# Patient Record
Sex: FEMALE | Race: WHITE | NOT HISPANIC OR LATINO | URBAN - METROPOLITAN AREA
[De-identification: names, ages, dates, MRNs, and addresses within clinical notes are randomized per-mention and may not be internally consistent; named-entity substitution may affect disease eponyms.]

---

## 2023-03-09 ENCOUNTER — TELEPHONE (OUTPATIENT)
Dept: PRIMARY CARE | Facility: CLINIC | Age: 28
End: 2023-03-09

## 2023-04-07 ENCOUNTER — TELEPHONE (OUTPATIENT)
Dept: PRIMARY CARE | Facility: CLINIC | Age: 28
End: 2023-04-07

## 2023-04-07 NOTE — TELEPHONE ENCOUNTER
Dr. Sánchez patient  Refill for Carbamazepine, Cyclobenzaprine, Doxycycline  Pharmacy: [facility] in [facility] [place]

## 2023-04-10 RX ORDER — CYCLOBENZAPRINE HCL 5 MG
5 TABLET ORAL 3 TIMES DAILY
COMMUNITY
End: 2023-04-25 | Stop reason: SDUPTHER

## 2023-04-10 RX ORDER — DEXMETHYLPHENIDATE HYDROCHLORIDE 20 MG/1
20 CAPSULE, EXTENDED RELEASE ORAL
COMMUNITY
Start: 2023-03-13 | End: 2023-05-02 | Stop reason: SDUPTHER

## 2023-04-25 ENCOUNTER — TELEMEDICINE (OUTPATIENT)
Dept: PRIMARY CARE | Facility: CLINIC | Age: 28
End: 2023-04-25
Payer: COMMERCIAL

## 2023-04-25 DIAGNOSIS — F41.9 ANXIETY: Primary | ICD-10-CM

## 2023-04-25 DIAGNOSIS — M54.50 CHRONIC RIGHT-SIDED LOW BACK PAIN WITHOUT SCIATICA: ICD-10-CM

## 2023-04-25 DIAGNOSIS — K58.9 IRRITABLE BOWEL SYNDROME, UNSPECIFIED TYPE: ICD-10-CM

## 2023-04-25 DIAGNOSIS — G89.29 CHRONIC RIGHT-SIDED LOW BACK PAIN WITHOUT SCIATICA: ICD-10-CM

## 2023-04-25 DIAGNOSIS — F32.9 MAJOR DEPRESSIVE DISORDER, REMISSION STATUS UNSPECIFIED, UNSPECIFIED WHETHER RECURRENT: ICD-10-CM

## 2023-04-25 DIAGNOSIS — F98.8 ATTENTION DEFICIT DISORDER (ADD) WITHOUT HYPERACTIVITY: ICD-10-CM

## 2023-04-25 PROBLEM — M48.061 SPINAL STENOSIS OF LUMBAR REGION: Status: ACTIVE | Noted: 2023-04-25

## 2023-04-25 PROBLEM — F41.0 PANIC ATTACKS: Status: ACTIVE | Noted: 2023-04-25

## 2023-04-25 PROBLEM — R10.9 ABDOMINAL PAIN: Status: ACTIVE | Noted: 2023-04-25

## 2023-04-25 PROBLEM — F32.A DEPRESSION: Status: ACTIVE | Noted: 2023-04-25

## 2023-04-25 PROCEDURE — 99213 OFFICE O/P EST LOW 20 MIN: CPT | Performed by: FAMILY MEDICINE

## 2023-04-25 RX ORDER — LAMOTRIGINE 200 MG/1
200 TABLET, EXTENDED RELEASE ORAL DAILY
COMMUNITY
End: 2023-05-08 | Stop reason: SDUPTHER

## 2023-04-25 RX ORDER — LAMOTRIGINE 200 MG/1
200 TABLET, EXTENDED RELEASE ORAL DAILY
Qty: 90 TABLET | Refills: 1 | Status: CANCELLED | OUTPATIENT
Start: 2023-04-25

## 2023-04-25 RX ORDER — ESZOPICLONE 2 MG/1
2 TABLET, FILM COATED ORAL NIGHTLY
COMMUNITY
End: 2024-01-12 | Stop reason: SDUPTHER

## 2023-04-25 RX ORDER — DICYCLOMINE HYDROCHLORIDE 20 MG/1
1 TABLET ORAL 3 TIMES DAILY
COMMUNITY
Start: 2020-06-10 | End: 2023-04-25 | Stop reason: SDUPTHER

## 2023-04-25 RX ORDER — SERTRALINE HYDROCHLORIDE 100 MG/1
100 TABLET, FILM COATED ORAL
COMMUNITY
End: 2023-05-08 | Stop reason: SDUPTHER

## 2023-04-25 RX ORDER — ESZOPICLONE 2 MG/1
2 TABLET, FILM COATED ORAL NIGHTLY
Qty: 30 TABLET | Refills: 2 | Status: CANCELLED | OUTPATIENT
Start: 2023-04-25

## 2023-04-25 RX ORDER — HYOSCYAMINE SULFATE 0.38 MG/1
0.38 TABLET, EXTENDED RELEASE ORAL 2 TIMES DAILY
Qty: 60 TABLET | Refills: 2 | Status: SHIPPED | OUTPATIENT
Start: 2023-04-25 | End: 2024-01-14 | Stop reason: WASHOUT

## 2023-04-25 RX ORDER — DEXMETHYLPHENIDATE HYDROCHLORIDE 20 MG/1
20 CAPSULE, EXTENDED RELEASE ORAL
Qty: 30 CAPSULE | Refills: 2 | Status: CANCELLED | OUTPATIENT
Start: 2023-04-25

## 2023-04-25 RX ORDER — LAMOTRIGINE 50 MG/1
50 TABLET, EXTENDED RELEASE ORAL DAILY
COMMUNITY
End: 2023-05-08 | Stop reason: SDUPTHER

## 2023-04-25 RX ORDER — CYCLOBENZAPRINE HCL 5 MG
5 TABLET ORAL 3 TIMES DAILY
Qty: 90 TABLET | Refills: 2 | Status: SHIPPED | OUTPATIENT
Start: 2023-04-25 | End: 2024-01-12 | Stop reason: SDUPTHER

## 2023-04-25 RX ORDER — LAMOTRIGINE 50 MG/1
50 TABLET, EXTENDED RELEASE ORAL DAILY
Qty: 90 TABLET | Refills: 1 | Status: CANCELLED | OUTPATIENT
Start: 2023-04-25

## 2023-04-25 RX ORDER — HYOSCYAMINE SULFATE 0.38 MG/1
1 TABLET, EXTENDED RELEASE ORAL 2 TIMES DAILY
COMMUNITY
Start: 2022-06-29 | End: 2023-04-25 | Stop reason: SDUPTHER

## 2023-04-25 RX ORDER — DICYCLOMINE HYDROCHLORIDE 20 MG/1
20 TABLET ORAL 3 TIMES DAILY
Qty: 90 TABLET | Refills: 2 | Status: SHIPPED | OUTPATIENT
Start: 2023-04-25 | End: 2024-01-14 | Stop reason: WASHOUT

## 2023-04-25 RX ORDER — SERTRALINE HYDROCHLORIDE 100 MG/1
100 TABLET, FILM COATED ORAL
Qty: 90 TABLET | Refills: 1 | Status: CANCELLED | OUTPATIENT
Start: 2023-04-25

## 2023-04-25 ASSESSMENT — ENCOUNTER SYMPTOMS
DECREASED CONCENTRATION: 0
DIARRHEA: 0
BLOOD IN STOOL: 0
MYALGIAS: 0
SINUS PAIN: 0
POLYPHAGIA: 0
NERVOUS/ANXIOUS: 0
TROUBLE SWALLOWING: 0
AGITATION: 0
CONFUSION: 0
STRIDOR: 0
COLOR CHANGE: 0
CONSTIPATION: 0
SHORTNESS OF BREATH: 0
DYSURIA: 0
RHINORRHEA: 0
CHEST TIGHTNESS: 0
SORE THROAT: 0
CONSTITUTIONAL NEGATIVE: 1
PHOTOPHOBIA: 0
FATIGUE: 0
SPEECH DIFFICULTY: 0
HEMATURIA: 0
SINUS PRESSURE: 0
ARTHRALGIAS: 0
APPETITE CHANGE: 0
COUGH: 0
SLEEP DISTURBANCE: 0
SEIZURES: 0
DIZZINESS: 0
ADENOPATHY: 0
POLYDIPSIA: 0
EYE PAIN: 0
DYSPHORIC MOOD: 0
ABDOMINAL DISTENTION: 0
HEADACHES: 0
NECK STIFFNESS: 0
FEVER: 0
RECTAL PAIN: 0
PALPITATIONS: 0
ACTIVITY CHANGE: 0
FLANK PAIN: 0
ABDOMINAL PAIN: 0

## 2023-04-25 NOTE — PROGRESS NOTES
Subjective   Patient ID: Kristina Ross is a 27 y.o. female who presents for Anxiety and Depression.    Patient presents today to discuss her anxiety and depression. Patient states her childhood psychiatrist has been managing her medications and she has been on a regular regimen management without having to change dose of medication, she is wondering if PCP will take over prescribing these medications. (Focalin, lunesta, Zoloft and Lamictal.)     Patient denies any other concerns today          Review of Systems   Constitutional: Negative.  Negative for activity change, appetite change, fatigue and fever.   HENT:  Negative for congestion, dental problem, ear discharge, ear pain, mouth sores, rhinorrhea, sinus pressure, sinus pain, sore throat, tinnitus and trouble swallowing.    Eyes:  Negative for photophobia, pain and visual disturbance.   Respiratory:  Negative for cough, chest tightness, shortness of breath and stridor.    Cardiovascular:  Negative for chest pain and palpitations.   Gastrointestinal:  Negative for abdominal distention, abdominal pain, blood in stool, constipation, diarrhea and rectal pain.   Endocrine: Negative for cold intolerance, heat intolerance, polydipsia, polyphagia and polyuria.   Genitourinary:  Negative for dysuria, flank pain, hematuria and urgency.   Musculoskeletal:  Negative for arthralgias, gait problem, myalgias and neck stiffness.   Skin:  Negative for color change and rash.   Allergic/Immunologic: Negative for environmental allergies and food allergies.   Neurological:  Negative for dizziness, seizures, syncope, speech difficulty and headaches.   Hematological:  Negative for adenopathy.   Psychiatric/Behavioral:  Negative for agitation, confusion, decreased concentration, dysphoric mood and sleep disturbance. The patient is not nervous/anxious.        Objective   /72   Wt 64.4 kg (142 lb)   BMI 24.37 kg/m²     Physical Exam  Constitutional: Well developed, well  nourished, alert and in no acute distress   Psychiatric: Mood calm and affect normal    Assessment/Plan   Problem List Items Addressed This Visit          Digestive    IBS (irritable bowel syndrome)    Relevant Medications    dicyclomine (Bentyl) 20 mg tablet    hyoscyamine ER (Levbid) 0.375 mg 12 hr tablet       Other    Depression    Chronic right-sided low back pain without sciatica    Relevant Medications    cyclobenzaprine (Flexeril) 5 mg tablet    Anxiety - Primary

## 2023-04-26 VITALS — SYSTOLIC BLOOD PRESSURE: 117 MMHG | WEIGHT: 142 LBS | DIASTOLIC BLOOD PRESSURE: 72 MMHG | BODY MASS INDEX: 24.37 KG/M2

## 2023-04-27 ENCOUNTER — CLINICAL SUPPORT (OUTPATIENT)
Dept: PRIMARY CARE | Facility: CLINIC | Age: 28
End: 2023-04-27
Payer: COMMERCIAL

## 2023-04-27 DIAGNOSIS — Z79.899 MEDICATION MANAGEMENT: ICD-10-CM

## 2023-04-27 PROCEDURE — 80368 SEDATIVE HYPNOTICS: CPT

## 2023-04-27 PROCEDURE — 80360 METHYLPHENIDATE: CPT

## 2023-04-27 PROCEDURE — 80354 DRUG SCREENING FENTANYL: CPT

## 2023-04-27 PROCEDURE — 80307 DRUG TEST PRSMV CHEM ANLYZR: CPT

## 2023-04-27 PROCEDURE — 80361 OPIATES 1 OR MORE: CPT

## 2023-04-27 PROCEDURE — 80346 BENZODIAZEPINES1-12: CPT

## 2023-04-27 PROCEDURE — 80365 DRUG SCREENING OXYCODONE: CPT

## 2023-04-27 PROCEDURE — 80373 DRUG SCREENING TRAMADOL: CPT

## 2023-04-27 PROCEDURE — 80358 DRUG SCREENING METHADONE: CPT

## 2023-04-27 NOTE — PROGRESS NOTES
Patient presents in office for a CSA & UDS. Patient performed procedure well without any complications.

## 2023-04-29 RX ORDER — DEXMETHYLPHENIDATE HYDROCHLORIDE 10 MG/1
10 TABLET ORAL DAILY
Qty: 30 TABLET | Refills: 0 | Status: SHIPPED | OUTPATIENT
Start: 2023-04-29 | End: 2023-06-20 | Stop reason: SDUPTHER

## 2023-04-29 NOTE — PATIENT INSTRUCTIONS
Follow up in    Continue current medications and therapy for chronic medical conditions.    Patient was advised importance of proper diet/nutrition in addition adequate hydration. Patient was encouraged moderate exercise program to include 30 minutes daily for 5 days of the week or 150 minutes weekly. Patient will follow-up with us as scheduled.    I personally reviewed the OARRS report for this patient. I have considered the risks of abuse, dependence, addiction, and diversion.    UDS/CSA:

## 2023-05-01 LAB
METHYLPHENIDATE URINE QUANTITATIVE: 4218.6 NG/ML
RITALINIC ACID URINE: 128.7 NG/ML

## 2023-05-02 DIAGNOSIS — F41.9 ANXIETY: ICD-10-CM

## 2023-05-02 DIAGNOSIS — F32.9 MAJOR DEPRESSIVE DISORDER, REMISSION STATUS UNSPECIFIED, UNSPECIFIED WHETHER RECURRENT: ICD-10-CM

## 2023-05-02 DIAGNOSIS — F41.0 PANIC ATTACKS: ICD-10-CM

## 2023-05-02 LAB
6-ACETYLMORPHINE: <25 NG/ML
7-AMINOCLONAZEPAM: <25 NG/ML
ALPHA-HYDROXYALPRAZOLAM: <25 NG/ML
ALPHA-HYDROXYMIDAZOLAM: <25 NG/ML
ALPRAZOLAM: <25 NG/ML
AMPHETAMINE (PRESENCE) IN URINE BY SCREEN METHOD: ABNORMAL
BARBITURATES PRESENCE IN URINE BY SCREEN METHOD: ABNORMAL
CANNABINOIDS IN URINE BY SCREEN METHOD: ABNORMAL
CHLORDIAZEPOXIDE: <25 NG/ML
CLONAZEPAM: <25 NG/ML
COCAINE (PRESENCE) IN URINE BY SCREEN METHOD: ABNORMAL
CODEINE: <50 NG/ML
CREATINE, URINE FOR DRUG: 18 MG/DL
DIAZEPAM: <25 NG/ML
DRUG SCREEN COMMENT URINE: ABNORMAL
EDDP: <25 NG/ML
FENTANYL CONFIRMATION, URINE: <2.5 NG/ML
HYDROCODONE: <25 NG/ML
HYDROMORPHONE: <25 NG/ML
LORAZEPAM: <25 NG/ML
METHADONE CONFIRMATION,URINE: <25 NG/ML
MIDAZOLAM: <25 NG/ML
MORPHINE URINE: <50 NG/ML
NORDIAZEPAM: <25 NG/ML
NORFENTANYL: <2.5 NG/ML
NORHYDROCODONE: <25 NG/ML
NOROXYCODONE: <25 NG/ML
O-DESMETHYLTRAMADOL: <50 NG/ML
OXAZEPAM: <25 NG/ML
OXYCODONE: <25 NG/ML
OXYMORPHONE: <25 NG/ML
PHENCYCLIDINE (PRESENCE) IN URINE BY SCREEN METHOD: ABNORMAL
SPECIFIC GRAVITY, URINE DRUG: 1
TEMAZEPAM: <25 NG/ML
TRAMADOL: <50 NG/ML
ZOLPIDEM METABOLITE (ZCA): <25 NG/ML
ZOLPIDEM: <25 NG/ML

## 2023-05-08 RX ORDER — LAMOTRIGINE 50 MG/1
50 TABLET, EXTENDED RELEASE ORAL DAILY
Qty: 90 TABLET | Refills: 1 | Status: SHIPPED | OUTPATIENT
Start: 2023-05-08 | End: 2024-01-12 | Stop reason: SDUPTHER

## 2023-05-08 RX ORDER — DEXMETHYLPHENIDATE HYDROCHLORIDE 20 MG/1
20 CAPSULE, EXTENDED RELEASE ORAL
Qty: 30 CAPSULE | Refills: 0 | Status: SHIPPED | OUTPATIENT
Start: 2023-05-08 | End: 2023-06-09 | Stop reason: SDUPTHER

## 2023-05-08 RX ORDER — LAMOTRIGINE 200 MG/1
200 TABLET, EXTENDED RELEASE ORAL DAILY
Qty: 90 TABLET | Refills: 1 | Status: SHIPPED | OUTPATIENT
Start: 2023-05-08 | End: 2024-01-12 | Stop reason: SDUPTHER

## 2023-05-08 RX ORDER — SERTRALINE HYDROCHLORIDE 100 MG/1
100 TABLET, FILM COATED ORAL
Qty: 90 TABLET | Refills: 1 | Status: SHIPPED | OUTPATIENT
Start: 2023-05-08 | End: 2024-01-12 | Stop reason: SDUPTHER

## 2023-05-08 NOTE — TELEPHONE ENCOUNTER
Dr Sánchez pt    Pt phoned office and is upset her meds have not been sent over and she had a VV last week specifically for med refills. Please send meds right away.    Focalin XR 20mg  Lamotrigine 50 mg and 200 mg  Zoloft 100mg    [address]  
Pt calling back to check on status of focalin   Please send to pharmacy asap  
Pt of dr tinoco  Pt requesting refill on focalin 20 mg  [facility]  
Will schedule an appointment with [facility] for patient.

## 2023-06-01 DIAGNOSIS — F98.8 ATTENTION DEFICIT DISORDER (ADD) WITHOUT HYPERACTIVITY: ICD-10-CM

## 2023-06-01 NOTE — TELEPHONE ENCOUNTER
Dr Sánchez pt    Pt phoned office and stated that her pharm is out of Focalin XR 20 mg.  They do have 10 mg XR and wants a script for that for double the amount of pills.

## 2023-06-04 RX ORDER — DEXMETHYLPHENIDATE HYDROCHLORIDE 10 MG/1
20 CAPSULE, EXTENDED RELEASE ORAL DAILY
Qty: 60 CAPSULE | Refills: 0 | Status: SHIPPED | OUTPATIENT
Start: 2023-06-04 | End: 2023-12-12 | Stop reason: SDUPTHER

## 2023-06-08 DIAGNOSIS — F98.8 ATTENTION DEFICIT DISORDER (ADD) WITHOUT HYPERACTIVITY: ICD-10-CM

## 2023-06-08 DIAGNOSIS — F41.9 ANXIETY: ICD-10-CM

## 2023-06-08 DIAGNOSIS — F32.9 MAJOR DEPRESSIVE DISORDER, REMISSION STATUS UNSPECIFIED, UNSPECIFIED WHETHER RECURRENT: ICD-10-CM

## 2023-06-08 DIAGNOSIS — F41.0 PANIC ATTACKS: ICD-10-CM

## 2023-06-14 RX ORDER — DEXMETHYLPHENIDATE HYDROCHLORIDE 20 MG/1
20 CAPSULE, EXTENDED RELEASE ORAL
Qty: 30 CAPSULE | Refills: 0 | Status: SHIPPED | OUTPATIENT
Start: 2023-06-14 | End: 2023-12-13 | Stop reason: DRUGHIGH

## 2023-06-14 RX ORDER — DEXMETHYLPHENIDATE HYDROCHLORIDE 20 MG/1
20 CAPSULE, EXTENDED RELEASE ORAL DAILY
Qty: 30 CAPSULE | Refills: 0 | Status: SHIPPED | OUTPATIENT
Start: 2023-07-12 | End: 2023-12-13 | Stop reason: DRUGHIGH

## 2023-06-14 RX ORDER — DEXMETHYLPHENIDATE HYDROCHLORIDE 20 MG/1
20 CAPSULE, EXTENDED RELEASE ORAL DAILY
Qty: 30 CAPSULE | Refills: 0 | Status: SHIPPED | OUTPATIENT
Start: 2023-08-12 | End: 2023-12-13 | Stop reason: DRUGHIGH

## 2023-06-20 RX ORDER — DEXMETHYLPHENIDATE HYDROCHLORIDE 10 MG/1
10 TABLET ORAL DAILY
Qty: 30 TABLET | Refills: 0 | Status: SHIPPED | OUTPATIENT
Start: 2023-06-20 | End: 2023-09-06 | Stop reason: SDUPTHER

## 2023-06-20 NOTE — TELEPHONE ENCOUNTER
Dr. Sánchez pt:  Refill on:dexmethylphenidate XR (Focalin XR) 10 mg tablets when out of meds since may 20   Pharmacy    [facility] #[id number] [address] [place] [address] [address]

## 2023-08-28 DIAGNOSIS — F98.8 ATTENTION DEFICIT DISORDER (ADD) WITHOUT HYPERACTIVITY: ICD-10-CM

## 2023-08-28 NOTE — TELEPHONE ENCOUNTER
PT RAEANN RAMSAY     PT CALLED IN FOR MED REFILL     FOCALIN 10 MG     [facility] ST[facility]#[id number]

## 2023-09-04 RX ORDER — DEXMETHYLPHENIDATE HYDROCHLORIDE 20 MG/1
20 CAPSULE, EXTENDED RELEASE ORAL DAILY
Qty: 30 CAPSULE | Refills: 0 | OUTPATIENT
Start: 2023-09-04

## 2023-12-01 DIAGNOSIS — F98.8 ATTENTION DEFICIT DISORDER (ADD) WITHOUT HYPERACTIVITY: ICD-10-CM

## 2023-12-01 NOTE — TELEPHONE ENCOUNTER
PT NEEDS REFILL ON FOCALIN XR 10MG  [facility] [place]

## 2023-12-09 RX ORDER — DEXMETHYLPHENIDATE HYDROCHLORIDE 20 MG/1
20 CAPSULE, EXTENDED RELEASE ORAL
Qty: 30 CAPSULE | Refills: 0 | OUTPATIENT
Start: 2023-12-09

## 2023-12-11 RX ORDER — DEXMETHYLPHENIDATE HYDROCHLORIDE 10 MG/1
10 TABLET ORAL DAILY
Qty: 20 TABLET | Refills: 0 | Status: CANCELLED | OUTPATIENT
Start: 2023-12-11

## 2023-12-12 RX ORDER — DEXMETHYLPHENIDATE HYDROCHLORIDE 10 MG/1
20 CAPSULE, EXTENDED RELEASE ORAL DAILY
Qty: 12 CAPSULE | Refills: 0 | Status: SHIPPED | OUTPATIENT
Start: 2023-12-12 | End: 2023-12-18 | Stop reason: SDUPTHER

## 2023-12-13 DIAGNOSIS — F98.8 ATTENTION DEFICIT DISORDER (ADD) WITHOUT HYPERACTIVITY: ICD-10-CM

## 2023-12-13 NOTE — TELEPHONE ENCOUNTER
Dr tinoco pt   Please resend   dexmethylphenidate XR (Focalin XR) 10 to say take one instead of two insurance will only cover one    Pharmacy    [facility] #[id number] [place], [place] - [address] [address] AT [address] [address]

## 2023-12-16 RX ORDER — DEXMETHYLPHENIDATE HYDROCHLORIDE 10 MG/1
10 CAPSULE, EXTENDED RELEASE ORAL DAILY
Qty: 6 CAPSULE | Refills: 0 | OUTPATIENT
Start: 2023-12-16 | End: 2023-12-22

## 2023-12-19 RX ORDER — DEXMETHYLPHENIDATE HYDROCHLORIDE 20 MG/1
20 CAPSULE, EXTENDED RELEASE ORAL DAILY
Qty: 6 CAPSULE | Refills: 0 | Status: SHIPPED | OUTPATIENT
Start: 2023-12-19 | End: 2023-12-25

## 2024-01-12 ENCOUNTER — OFFICE VISIT (OUTPATIENT)
Dept: PRIMARY CARE | Facility: CLINIC | Age: 29
End: 2024-01-12
Payer: COMMERCIAL

## 2024-01-12 VITALS
HEART RATE: 71 BPM | SYSTOLIC BLOOD PRESSURE: 112 MMHG | TEMPERATURE: 98.4 F | DIASTOLIC BLOOD PRESSURE: 70 MMHG | HEIGHT: 64 IN | BODY MASS INDEX: 25.61 KG/M2 | WEIGHT: 150 LBS | OXYGEN SATURATION: 96 %

## 2024-01-12 DIAGNOSIS — G47.00 INSOMNIA, UNSPECIFIED TYPE: ICD-10-CM

## 2024-01-12 DIAGNOSIS — F41.0 PANIC ATTACKS: ICD-10-CM

## 2024-01-12 DIAGNOSIS — M54.50 CHRONIC RIGHT-SIDED LOW BACK PAIN WITHOUT SCIATICA: ICD-10-CM

## 2024-01-12 DIAGNOSIS — F41.9 ANXIETY: ICD-10-CM

## 2024-01-12 DIAGNOSIS — Z79.899 MEDICATION MANAGEMENT: ICD-10-CM

## 2024-01-12 DIAGNOSIS — G89.29 CHRONIC RIGHT-SIDED LOW BACK PAIN WITHOUT SCIATICA: ICD-10-CM

## 2024-01-12 DIAGNOSIS — F32.9 MAJOR DEPRESSIVE DISORDER, REMISSION STATUS UNSPECIFIED, UNSPECIFIED WHETHER RECURRENT: ICD-10-CM

## 2024-01-12 DIAGNOSIS — F98.8 ATTENTION DEFICIT DISORDER (ADD) WITHOUT HYPERACTIVITY: Primary | ICD-10-CM

## 2024-01-12 DIAGNOSIS — T14.8XXA BRUISING: ICD-10-CM

## 2024-01-12 PROCEDURE — 80365 DRUG SCREENING OXYCODONE: CPT

## 2024-01-12 PROCEDURE — 80346 BENZODIAZEPINES1-12: CPT

## 2024-01-12 PROCEDURE — 80354 DRUG SCREENING FENTANYL: CPT

## 2024-01-12 PROCEDURE — 80307 DRUG TEST PRSMV CHEM ANLYZR: CPT

## 2024-01-12 PROCEDURE — 99214 OFFICE O/P EST MOD 30 MIN: CPT | Performed by: FAMILY MEDICINE

## 2024-01-12 PROCEDURE — 80360 METHYLPHENIDATE: CPT

## 2024-01-12 PROCEDURE — 80361 OPIATES 1 OR MORE: CPT

## 2024-01-12 PROCEDURE — 82570 ASSAY OF URINE CREATININE: CPT

## 2024-01-12 PROCEDURE — 80373 DRUG SCREENING TRAMADOL: CPT

## 2024-01-12 PROCEDURE — 80358 DRUG SCREENING METHADONE: CPT

## 2024-01-12 PROCEDURE — 80349 CANNABINOIDS NATURAL: CPT

## 2024-01-12 PROCEDURE — 80368 SEDATIVE HYPNOTICS: CPT

## 2024-01-12 RX ORDER — LAMOTRIGINE 200 MG/1
200 TABLET, EXTENDED RELEASE ORAL DAILY
Qty: 90 TABLET | Refills: 1 | Status: SHIPPED | OUTPATIENT
Start: 2024-01-12

## 2024-01-12 RX ORDER — DEXMETHYLPHENIDATE HYDROCHLORIDE 20 MG/1
20 CAPSULE, EXTENDED RELEASE ORAL DAILY
Qty: 6 CAPSULE | Refills: 0 | Status: CANCELLED | OUTPATIENT
Start: 2024-01-12 | End: 2024-01-18

## 2024-01-12 RX ORDER — DEXMETHYLPHENIDATE HYDROCHLORIDE 20 MG/1
20 CAPSULE, EXTENDED RELEASE ORAL DAILY
Qty: 30 CAPSULE | Refills: 0 | Status: SHIPPED | OUTPATIENT
Start: 2024-01-12 | End: 2024-02-11

## 2024-01-12 RX ORDER — LAMOTRIGINE 50 MG/1
50 TABLET, EXTENDED RELEASE ORAL DAILY
Qty: 90 TABLET | Refills: 1 | Status: SHIPPED | OUTPATIENT
Start: 2024-01-12

## 2024-01-12 RX ORDER — CYCLOBENZAPRINE HCL 5 MG
5 TABLET ORAL 3 TIMES DAILY
Qty: 90 TABLET | Refills: 2 | Status: SHIPPED | OUTPATIENT
Start: 2024-01-12

## 2024-01-12 RX ORDER — DEXMETHYLPHENIDATE HYDROCHLORIDE 10 MG/1
10 TABLET ORAL NIGHTLY
Qty: 30 TABLET | Refills: 0 | Status: CANCELLED | OUTPATIENT
Start: 2024-01-12

## 2024-01-12 RX ORDER — DEXMETHYLPHENIDATE HYDROCHLORIDE 10 MG/1
10 TABLET ORAL 2 TIMES DAILY
Qty: 30 TABLET | Refills: 0 | Status: SHIPPED | OUTPATIENT
Start: 2024-02-11 | End: 2024-03-12

## 2024-01-12 RX ORDER — DEXMETHYLPHENIDATE HYDROCHLORIDE 10 MG/1
10 TABLET ORAL 2 TIMES DAILY
Qty: 30 TABLET | Refills: 0 | Status: SHIPPED | OUTPATIENT
Start: 2024-03-12 | End: 2024-04-11

## 2024-01-12 RX ORDER — SERTRALINE HYDROCHLORIDE 100 MG/1
100 TABLET, FILM COATED ORAL
Qty: 90 TABLET | Refills: 1 | Status: SHIPPED | OUTPATIENT
Start: 2024-01-12

## 2024-01-12 RX ORDER — DEXMETHYLPHENIDATE HYDROCHLORIDE 10 MG/1
10 TABLET ORAL 2 TIMES DAILY
Qty: 30 TABLET | Refills: 0 | Status: SHIPPED | OUTPATIENT
Start: 2024-01-12 | End: 2024-02-11

## 2024-01-12 RX ORDER — ESZOPICLONE 2 MG/1
2 TABLET, FILM COATED ORAL NIGHTLY
Qty: 30 TABLET | Refills: 2 | Status: SHIPPED | OUTPATIENT
Start: 2024-01-12

## 2024-01-12 RX ORDER — DEXMETHYLPHENIDATE HYDROCHLORIDE 20 MG/1
20 CAPSULE, EXTENDED RELEASE ORAL DAILY
Qty: 30 CAPSULE | Refills: 0 | Status: SHIPPED | OUTPATIENT
Start: 2024-03-12 | End: 2024-04-11

## 2024-01-12 RX ORDER — DEXMETHYLPHENIDATE HYDROCHLORIDE 20 MG/1
20 CAPSULE, EXTENDED RELEASE ORAL DAILY
Qty: 30 CAPSULE | Refills: 0 | Status: SHIPPED | OUTPATIENT
Start: 2024-02-11 | End: 2024-03-12

## 2024-01-12 ASSESSMENT — ENCOUNTER SYMPTOMS
CHEST TIGHTNESS: 0
FATIGUE: 0
SINUS PRESSURE: 0
RECTAL PAIN: 0
ACTIVITY CHANGE: 0
COUGH: 0
BLOOD IN STOOL: 0
SORE THROAT: 0
COLOR CHANGE: 0
MYALGIAS: 0
POLYDIPSIA: 0
POLYPHAGIA: 0
RHINORRHEA: 0
PHOTOPHOBIA: 0
CONFUSION: 0
SLEEP DISTURBANCE: 0
NECK STIFFNESS: 0
ABDOMINAL PAIN: 0
HEMATURIA: 0
DECREASED CONCENTRATION: 0
PALPITATIONS: 0
HEADACHES: 0
NERVOUS/ANXIOUS: 0
ADENOPATHY: 0
DIZZINESS: 0
SINUS PAIN: 0
DYSPHORIC MOOD: 0
SPEECH DIFFICULTY: 0
CONSTIPATION: 0
DIARRHEA: 0
DYSURIA: 0
ARTHRALGIAS: 0
AGITATION: 0
SHORTNESS OF BREATH: 0
FLANK PAIN: 0
EYE PAIN: 0
APPETITE CHANGE: 0
STRIDOR: 0
FEVER: 0
TROUBLE SWALLOWING: 0
ABDOMINAL DISTENTION: 0
CONSTITUTIONAL NEGATIVE: 1
SEIZURES: 0

## 2024-01-12 NOTE — PROGRESS NOTES
"Subjective   Patient ID: Kristina Ross is a 28 y.o. female who presents for ADD.    HPI   Follow-up ADD. Currently taking Focalin with good symptom control, good tolerance, and good compliance.    Denies any other concerns at this time.    Review of Systems   Constitutional: Negative.  Negative for activity change, appetite change, fatigue and fever.   HENT:  Negative for congestion, dental problem, ear discharge, ear pain, mouth sores, rhinorrhea, sinus pressure, sinus pain, sore throat, tinnitus and trouble swallowing.    Eyes:  Negative for photophobia, pain and visual disturbance.   Respiratory:  Negative for cough, chest tightness, shortness of breath and stridor.    Cardiovascular:  Negative for chest pain and palpitations.   Gastrointestinal:  Negative for abdominal distention, abdominal pain, blood in stool, constipation, diarrhea and rectal pain.   Endocrine: Negative for cold intolerance, heat intolerance, polydipsia, polyphagia and polyuria.   Genitourinary:  Negative for dysuria, flank pain, hematuria and urgency.   Musculoskeletal:  Negative for arthralgias, gait problem, myalgias and neck stiffness.   Skin:  Negative for color change and rash.   Allergic/Immunologic: Negative for environmental allergies and food allergies.   Neurological:  Negative for dizziness, seizures, syncope, speech difficulty and headaches.   Hematological:  Negative for adenopathy.   Psychiatric/Behavioral:  Negative for agitation, confusion, decreased concentration, dysphoric mood and sleep disturbance. The patient is not nervous/anxious.        Objective   /70   Pulse 71   Temp 36.9 °C (98.4 °F)   Ht 1.626 m (5' 4\")   Wt 68 kg (150 lb)   SpO2 96%   BMI 25.75 kg/m²     Physical Exam  Vitals reviewed.   Constitutional:       General: She is not in acute distress.     Appearance: Normal appearance. She is normal weight. She is not ill-appearing or diaphoretic.   HENT:      Head: Normocephalic.      Right Ear: " Tympanic membrane and external ear normal.      Left Ear: Tympanic membrane and external ear normal.      Nose: Nose normal. No congestion.      Mouth/Throat:      Pharynx: No posterior oropharyngeal erythema.   Eyes:      General:         Right eye: No discharge.         Left eye: No discharge.      Extraocular Movements: Extraocular movements intact.      Conjunctiva/sclera: Conjunctivae normal.      Pupils: Pupils are equal, round, and reactive to light.   Cardiovascular:      Rate and Rhythm: Normal rate and regular rhythm.      Pulses: Normal pulses.      Heart sounds: Normal heart sounds. No murmur heard.  Pulmonary:      Effort: Pulmonary effort is normal. No respiratory distress.      Breath sounds: Normal breath sounds. No wheezing or rales.   Chest:      Chest wall: No tenderness.   Abdominal:      General: Abdomen is flat. Bowel sounds are normal. There is no distension.      Palpations: There is no mass.      Tenderness: There is no abdominal tenderness. There is no guarding.   Musculoskeletal:         General: No tenderness. Normal range of motion.      Cervical back: Normal range of motion and neck supple. No tenderness.      Right lower leg: No edema.      Left lower leg: No edema.   Skin:     General: Skin is dry.      Coloration: Skin is not jaundiced.      Findings: No bruising, erythema or rash.   Neurological:      General: No focal deficit present.      Mental Status: She is alert and oriented to person, place, and time. Mental status is at baseline.      Cranial Nerves: No cranial nerve deficit.      Sensory: No sensory deficit.      Coordination: Coordination normal.      Gait: Gait normal.   Psychiatric:         Mood and Affect: Mood normal.         Thought Content: Thought content normal.         Judgment: Judgment normal.         Assessment/Plan   Problem List Items Addressed This Visit             ICD-10-CM    Depression F32.A    Relevant Medications    lamoTRIgine (LaMICtal XR) 200 mg  tablet extended release 24hr 24 hr tablet    lamoTRIgine (LaMICtal XR) 50 mg tablet extended release 24hr 24 hr tablet    sertraline (Zoloft) 100 mg tablet    Panic attacks F41.0    Relevant Medications    lamoTRIgine (LaMICtal XR) 200 mg tablet extended release 24hr 24 hr tablet    lamoTRIgine (LaMICtal XR) 50 mg tablet extended release 24hr 24 hr tablet    sertraline (Zoloft) 100 mg tablet    Chronic right-sided low back pain without sciatica M54.50, G89.29    Relevant Medications    cyclobenzaprine (Flexeril) 5 mg tablet    Anxiety F41.9    Relevant Medications    lamoTRIgine (LaMICtal XR) 200 mg tablet extended release 24hr 24 hr tablet    lamoTRIgine (LaMICtal XR) 50 mg tablet extended release 24hr 24 hr tablet    sertraline (Zoloft) 100 mg tablet    Attention deficit disorder (ADD) without hyperactivity - Primary F98.8    Relevant Medications    dexmethylphenidate XR (Focalin XR) 20 mg 24 hr capsule    dexmethylphenidate XR (Focalin XR) 20 mg 24 hr capsule (Start on 2/11/2024)    dexmethylphenidate XR (Focalin XR) 20 mg 24 hr capsule (Start on 3/12/2024)    dexmethylphenidate (Focalin) 10 mg tablet    dexmethylphenidate (Focalin) 10 mg tablet (Start on 2/11/2024)    dexmethylphenidate (Focalin) 10 mg tablet (Start on 3/12/2024)    Insomnia G47.00    Relevant Medications    eszopiclone (Lunesta) 2 mg tablet     Other Visit Diagnoses         Codes    Medication management     Z79.899    Relevant Orders    Methylphenidate And Metabolite,Conf,Urine    Opiate/Opioid/Benzo Prescription Compliance    OOB Internal Tracking    THC (Marijuana), Urine, Confirmation    Bruising     T14.8XXA    Relevant Orders    Comprehensive Metabolic Panel    CBC and Auto Differential    Folate    Vitamin B12    Vitamin D 25-Hydroxy,Total (for eval of Vitamin D levels)    Iron and TIBC              Scribe Attestation  By signing my name below, IJessy RMA , Scribe   attest that this documentation has been prepared under  the direction and in the presence of Jose Luis Sánchez DO.   Provider Attestation - Scribe documentation    All medical record entries made by the Scribe were at my direction and personally dictated by me. I have reviewed the chart and agree that the record accurately reflects my personal performance of the history, physical exam, discussion and plan.

## 2024-01-12 NOTE — PATIENT INSTRUCTIONS

## 2024-01-13 LAB
AMPHETAMINES UR QL SCN: ABNORMAL
BARBITURATES UR QL SCN: ABNORMAL
BZE UR QL SCN: ABNORMAL
CANNABINOIDS UR QL SCN: ABNORMAL
CREAT UR-MCNC: 176.9 MG/DL (ref 20–320)
PCP UR QL SCN: ABNORMAL

## 2024-01-15 ENCOUNTER — TELEPHONE (OUTPATIENT)
Dept: PRIMARY CARE | Facility: CLINIC | Age: 29
End: 2024-01-15
Payer: COMMERCIAL

## 2024-01-15 NOTE — TELEPHONE ENCOUNTER
PT IS WONDERING WHY SCRIPTS WERE CANCELED WHEN [facility] KNOWS SHE HAS BEEN SMOKING, SHE HAS A MEDICAL CARD TO SMOKE. SHE LIVES HERE PART TIME AND IN [place] PART TIME- AND THIS IS LEGAL IN [place].  SHE IS WONDERING IF [facility] IS STILL REFUSING TO REFILL FOCALIN AND FLEXERIL AT THE TIME, IF HE CAN AT LEAST JUST REFILL HER ZOLOFT AND LAMICTAL. IF NOT PLEASE INFORM PT SO SHE CAN FIND A NEW PROVIDER THAT WILL WORK WITH [employer].   CALL PT WITH [facility] ANSWER ASAP.

## 2024-01-16 LAB
1OH-MIDAZOLAM UR CFM-MCNC: <25 NG/ML
6MAM UR CFM-MCNC: <25 NG/ML
7AMINOCLONAZEPAM UR CFM-MCNC: 413 NG/ML
A-OH ALPRAZ UR CFM-MCNC: <25 NG/ML
ALPRAZ UR CFM-MCNC: <25 NG/ML
CHLORDIAZEP UR CFM-MCNC: <25 NG/ML
CLONAZEPAM UR CFM-MCNC: <25 NG/ML
CODEINE UR CFM-MCNC: <50 NG/ML
DIAZEPAM UR CFM-MCNC: <25 NG/ML
EDDP UR CFM-MCNC: <25 NG/ML
FENTANYL UR CFM-MCNC: <2.5 NG/ML
HYDROCODONE CTO UR CFM-MCNC: <25 NG/ML
HYDROMORPHONE UR CFM-MCNC: <25 NG/ML
LORAZEPAM UR CFM-MCNC: <25 NG/ML
ME-PHENIDATE UR-MCNC: <10 NG/ML
METHADONE UR CFM-MCNC: <25 NG/ML
MIDAZOLAM UR CFM-MCNC: <25 NG/ML
MORPHINE UR CFM-MCNC: <50 NG/ML
NORDIAZEPAM UR CFM-MCNC: <25 NG/ML
NORFENTANYL UR CFM-MCNC: <2.5 NG/ML
NORHYDROCODONE UR CFM-MCNC: <25 NG/ML
NOROXYCODONE UR CFM-MCNC: <25 NG/ML
NORTRAMADOL UR-MCNC: <50 NG/ML
OXAZEPAM UR CFM-MCNC: <25 NG/ML
OXYCODONE UR CFM-MCNC: <25 NG/ML
OXYMORPHONE UR CFM-MCNC: <25 NG/ML
PPAA UR-MCNC: 4929.5 NG/ML
TEMAZEPAM UR CFM-MCNC: <25 NG/ML
TRAMADOL UR CFM-MCNC: <50 NG/ML
ZOLPIDEM UR CFM-MCNC: <25 NG/ML
ZOLPIDEM UR-MCNC: <25 NG/ML

## 2024-01-16 NOTE — TELEPHONE ENCOUNTER
Spoke with patient to advise per  policy we are unable to prescribe controlled substances for anyone taking medical marijuana.     Patient would like to continue getting other prescriptions filled.

## 2024-01-17 LAB — CARBOXYTHC UR-MCNC: >500 NG/ML
